# Patient Record
Sex: MALE | Race: WHITE | NOT HISPANIC OR LATINO | Employment: OTHER | URBAN - METROPOLITAN AREA
[De-identification: names, ages, dates, MRNs, and addresses within clinical notes are randomized per-mention and may not be internally consistent; named-entity substitution may affect disease eponyms.]

---

## 2020-02-20 ENCOUNTER — APPOINTMENT (OUTPATIENT)
Dept: RADIOLOGY | Facility: MEDICAL CENTER | Age: 65
End: 2020-02-20
Attending: EMERGENCY MEDICINE

## 2020-02-20 ENCOUNTER — HOSPITAL ENCOUNTER (EMERGENCY)
Facility: MEDICAL CENTER | Age: 65
End: 2020-02-20
Attending: EMERGENCY MEDICINE

## 2020-02-20 VITALS
SYSTOLIC BLOOD PRESSURE: 106 MMHG | DIASTOLIC BLOOD PRESSURE: 62 MMHG | OXYGEN SATURATION: 94 % | BODY MASS INDEX: 30.07 KG/M2 | HEIGHT: 73 IN | HEART RATE: 80 BPM | TEMPERATURE: 96.9 F | WEIGHT: 226.85 LBS | RESPIRATION RATE: 14 BRPM

## 2020-02-20 DIAGNOSIS — R10.11 ABDOMINAL PAIN, RUQ: ICD-10-CM

## 2020-02-20 DIAGNOSIS — K74.60 CIRRHOSIS OF LIVER WITHOUT ASCITES, UNSPECIFIED HEPATIC CIRRHOSIS TYPE (HCC): ICD-10-CM

## 2020-02-20 LAB
ALBUMIN SERPL BCP-MCNC: 4.1 G/DL (ref 3.2–4.9)
ALBUMIN/GLOB SERPL: 1 G/DL
ALP SERPL-CCNC: 76 U/L (ref 30–99)
ALT SERPL-CCNC: 124 U/L (ref 2–50)
ANION GAP SERPL CALC-SCNC: 11 MMOL/L (ref 0–11.9)
APPEARANCE UR: CLEAR
AST SERPL-CCNC: 95 U/L (ref 12–45)
BASOPHILS # BLD AUTO: 0.9 % (ref 0–1.8)
BASOPHILS # BLD: 0.08 K/UL (ref 0–0.12)
BILIRUB SERPL-MCNC: 0.6 MG/DL (ref 0.1–1.5)
BILIRUB UR QL STRIP.AUTO: NEGATIVE
BUN SERPL-MCNC: 24 MG/DL (ref 8–22)
CALCIUM SERPL-MCNC: 9.4 MG/DL (ref 8.5–10.5)
CHLORIDE SERPL-SCNC: 103 MMOL/L (ref 96–112)
CO2 SERPL-SCNC: 23 MMOL/L (ref 20–33)
COLOR UR: YELLOW
CREAT SERPL-MCNC: 0.99 MG/DL (ref 0.5–1.4)
EOSINOPHIL # BLD AUTO: 0.25 K/UL (ref 0–0.51)
EOSINOPHIL NFR BLD: 2.8 % (ref 0–6.9)
ERYTHROCYTE [DISTWIDTH] IN BLOOD BY AUTOMATED COUNT: 43.1 FL (ref 35.9–50)
GLOBULIN SER CALC-MCNC: 4 G/DL (ref 1.9–3.5)
GLUCOSE SERPL-MCNC: 93 MG/DL (ref 65–99)
GLUCOSE UR STRIP.AUTO-MCNC: NEGATIVE MG/DL
HCT VFR BLD AUTO: 45.2 % (ref 42–52)
HGB BLD-MCNC: 16 G/DL (ref 14–18)
IMM GRANULOCYTES # BLD AUTO: 0.08 K/UL (ref 0–0.11)
IMM GRANULOCYTES NFR BLD AUTO: 0.9 % (ref 0–0.9)
KETONES UR STRIP.AUTO-MCNC: NEGATIVE MG/DL
LEUKOCYTE ESTERASE UR QL STRIP.AUTO: NEGATIVE
LIPASE SERPL-CCNC: 60 U/L (ref 11–82)
LYMPHOCYTES # BLD AUTO: 3.13 K/UL (ref 1–4.8)
LYMPHOCYTES NFR BLD: 34.9 % (ref 22–41)
MCH RBC QN AUTO: 31.6 PG (ref 27–33)
MCHC RBC AUTO-ENTMCNC: 35.4 G/DL (ref 33.7–35.3)
MCV RBC AUTO: 89.3 FL (ref 81.4–97.8)
MICRO URNS: NORMAL
MONOCYTES # BLD AUTO: 0.66 K/UL (ref 0–0.85)
MONOCYTES NFR BLD AUTO: 7.3 % (ref 0–13.4)
NEUTROPHILS # BLD AUTO: 4.78 K/UL (ref 1.82–7.42)
NEUTROPHILS NFR BLD: 53.2 % (ref 44–72)
NITRITE UR QL STRIP.AUTO: NEGATIVE
NRBC # BLD AUTO: 0 K/UL
NRBC BLD-RTO: 0 /100 WBC
PH UR STRIP.AUTO: 6 [PH] (ref 5–8)
PLATELET # BLD AUTO: 135 K/UL (ref 164–446)
PMV BLD AUTO: 9.5 FL (ref 9–12.9)
POTASSIUM SERPL-SCNC: 4 MMOL/L (ref 3.6–5.5)
PROT SERPL-MCNC: 8.1 G/DL (ref 6–8.2)
PROT UR QL STRIP: NEGATIVE MG/DL
RBC # BLD AUTO: 5.06 M/UL (ref 4.7–6.1)
RBC UR QL AUTO: NEGATIVE
SODIUM SERPL-SCNC: 137 MMOL/L (ref 135–145)
SP GR UR STRIP.AUTO: 1.02
UROBILINOGEN UR STRIP.AUTO-MCNC: 1 MG/DL
WBC # BLD AUTO: 9 K/UL (ref 4.8–10.8)

## 2020-02-20 PROCEDURE — 99284 EMERGENCY DEPT VISIT MOD MDM: CPT

## 2020-02-20 PROCEDURE — 76705 ECHO EXAM OF ABDOMEN: CPT

## 2020-02-20 PROCEDURE — 85025 COMPLETE CBC W/AUTO DIFF WBC: CPT

## 2020-02-20 PROCEDURE — 81003 URINALYSIS AUTO W/O SCOPE: CPT

## 2020-02-20 PROCEDURE — 74019 RADEX ABDOMEN 2 VIEWS: CPT

## 2020-02-20 PROCEDURE — 36415 COLL VENOUS BLD VENIPUNCTURE: CPT

## 2020-02-20 PROCEDURE — 83690 ASSAY OF LIPASE: CPT

## 2020-02-20 PROCEDURE — 80053 COMPREHEN METABOLIC PANEL: CPT

## 2020-02-20 RX ORDER — OMEPRAZOLE 40 MG/1
40 CAPSULE, DELAYED RELEASE ORAL DAILY
Qty: 30 CAP | Refills: 0 | Status: SHIPPED | OUTPATIENT
Start: 2020-02-20

## 2020-02-21 NOTE — DISCHARGE INSTRUCTIONS
We could not find a definite cause of your pain.  The pain may be from your gallbladder.  There is no ascites.  Take limited Tylenol, no more than 2 g a day if needed for pain.  Take daily Prilosec 40 mg for 4 weeks in case the pain is gastritis or ulcer.  Follow-up with your GI doctor next week to discuss the possibility of cholecystitis.  Return to the ER for severe pain, recurrent vomiting, fever or ill appearance.

## 2020-02-21 NOTE — ED NOTES
US completed, patient resting in bed comfortably. Denies needs at this time, call light within reach

## 2020-02-21 NOTE — ED NOTES
Report received, assumed care of patient. PIV placed, updated patient on POC. Call light within reach

## 2020-02-21 NOTE — ED PROVIDER NOTES
"ED Provider Note    Scribed for Stephan Lebron M.D. by Ernestina Esquivel. 2/20/2020  7:20 PM    Primary care provider: PCP Pt. States None.  Means of arrival: Walk-in  History obtained from: Patient  History limited by: None    CHIEF COMPLAINT  Chief Complaint   Patient presents with   • Abdominal Swelling     cirrhosis       HPI  Huber Ny is a 64 y.o. male who presents to the Emergency Department for evaluation of abdominal pain and bloating onset 4 days ago. He describes his pain as 7/10 in severity. His pain is exacerbated when bending down. He reports of nausea, constipation, increased urine frequency, and shortness of breath, but denies any fever, vomiting, hematuria, diarrhea, chest pain, or cough. Patient reported that he has recently been diagnosed with hepatitis C and cirrhosis. He is not on any medications currently for his liver problems. Additionally, the patient has a history of stent placements and is on Plavix. He denies a past surgical history of cholecystectomy. Patient smokes 10 cigarettes per day.     REVIEW OF SYSTEMS  Pertinent positives include: abdominal pain and bloating, nausea, constipation, increased urine frequency, shortness of breath.  Pertinent negatives include: fever, vomiting, hematuria, diarrhea, chest pain, or cough..  10+ systems reviewed and negative.      PAST MEDICAL HISTORY  Past history of cirrhosis and hepatitis C.    SOCIAL HISTORY  Social History     Tobacco Use   • Smoking status: Yes, 10 cigarettes a day   Substance Use Topics   • Alcohol use: None noted.   • Drug use: None noted.     Social History     Substance and Sexual Activity   Drug Use None noted     SURGICAL HISTORY  Stent placement    CURRENT MEDICATIONS  Plavix  ALLERGIES  No Known Allergies    PHYSICAL EXAM  VITAL SIGNS: /83   Pulse 90   Temp 36.1 °C (96.9 °F) (Temporal)   Resp 14   Ht 1.854 m (6' 1\")   Wt 102.9 kg (226 lb 13.7 oz)   SpO2 95%   BMI 29.93 kg/m²   Reviewed and hypertensive, " afebrile  Constitutional: Well developed, Well nourished, patient is resting comfortably in bed.  HENT: Normocephalic, atraumatic, bilateral external ears normal, oropharynx moist, No exudates or erythema.   Eyes: PERRLA, conjunctiva pink, no scleral icterus.   Cardiovascular: Regular rate and rhythm. No murmurs, rubs or gallops.   Respiratory: Lungs clear to auscultation bilaterally. No wheezes, rales, or rhonchi.  Abdominal:  Abdomen soft, distended, possible mild fluid wave, no real tenderness. No rebound, or guarding.    Skin: No erythema, no rash.   Genitourinary: No costovertebral angle tenderness.   Neurologic: Alert & oriented x 3, cranial nerves 2-12 intact by passive exam.  No focal deficit noted.  Psychiatric: Affect normal, Judgment normal, Mood normal.     DIFFERENTIAL DIAGNOSIS:  Hep. C, cirrhosis, alcoholic liver disease, small bowel obstruction, doubt spontaneous bacterial peritonitis, gastritis, peptic ulcer disease    RADIOLOGY/PROCEDURES  [id number] VIEWS   Final Result         1.  Moderate stool in the colon favors changes of constipation. Otherwise nonspecific bowel gas pattern   2.  Linear density in the lung bases favors atelectasis or scarring.      US-RUQ   Final Result         1.  Hepatomegaly and echogenic liver with nodular contour, compatible changes of cirrhosis.   2.  Cholelithiasis with gallbladder wall thickness of the upper limits of normal   3.  Linear echogenic defect in the lower pole of the right kidney suggests scarring, could be followed up for more definitive characterization with CT of the kidneys with contrast.   4.  Atherosclerosis        Radiologist interpretation have been reviewed by me.     LABORATORY:  Results for orders placed or performed during the hospital encounter of 02/20/20   CBC WITH DIFFERENTIAL   Result Value Ref Range    WBC 9.0 4.8 - 10.8 K/uL    RBC 5.06 4.70 - 6.10 M/uL    Hemoglobin 16.0 14.0 - 18.0 g/dL    Hematocrit 45.2 42.0 - 52.0 %    MCV 89.3  81.4 - 97.8 fL    MCH 31.6 27.0 - 33.0 pg    MCHC 35.4 (H) 33.7 - 35.3 g/dL    RDW 43.1 35.9 - 50.0 fL    Platelet Count 135 (L) 164 - 446 K/uL    MPV 9.5 9.0 - 12.9 fL    Neutrophils-Polys 53.20 44.00 - 72.00 %    Lymphocytes 34.90 22.00 - 41.00 %    Monocytes 7.30 0.00 - 13.40 %    Eosinophils 2.80 0.00 - 6.90 %    Basophils 0.90 0.00 - 1.80 %    Immature Granulocytes 0.90 0.00 - 0.90 %    Nucleated RBC 0.00 /100 WBC    Neutrophils (Absolute) 4.78 1.82 - 7.42 K/uL    Lymphs (Absolute) 3.13 1.00 - 4.80 K/uL    Monos (Absolute) 0.66 0.00 - 0.85 K/uL    Eos (Absolute) 0.25 0.00 - 0.51 K/uL    Baso (Absolute) 0.08 0.00 - 0.12 K/uL    Immature Granulocytes (abs) 0.08 0.00 - 0.11 K/uL    NRBC (Absolute) 0.00 K/uL   COMP METABOLIC PANEL   Result Value Ref Range    Sodium 137 135 - 145 mmol/L    Potassium 4.0 3.6 - 5.5 mmol/L    Chloride 103 96 - 112 mmol/L    Co2 23 20 - 33 mmol/L    Anion Gap 11.0 0.0 - 11.9    Glucose 93 65 - 99 mg/dL    Bun 24 (H) 8 - 22 mg/dL    Creatinine 0.99 0.50 - 1.40 mg/dL    Calcium 9.4 8.5 - 10.5 mg/dL    AST(SGOT) 95 (H) 12 - 45 U/L    ALT(SGPT) 124 (H) 2 - 50 U/L    Alkaline Phosphatase 76 30 - 99 U/L    Total Bilirubin 0.6 0.1 - 1.5 mg/dL    Albumin 4.1 3.2 - 4.9 g/dL    Total Protein 8.1 6.0 - 8.2 g/dL    Globulin 4.0 (H) 1.9 - 3.5 g/dL    A-G Ratio 1.0 g/dL   LIPASE   Result Value Ref Range    Lipase 60 11 - 82 U/L   URINALYSIS,CULTURE IF INDICATED   Result Value Ref Range    Color Yellow     Character Clear     Specific Gravity 1.016 <1.035    Ph 6.0 5.0 - 8.0    Glucose Negative Negative mg/dL    Ketones Negative Negative mg/dL    Protein Negative Negative mg/dL    Bilirubin Negative Negative    Urobilinogen, Urine 1.0 Negative    Nitrite Negative Negative    Leukocyte Esterase Negative Negative    Occult Blood Negative Negative    Micro Urine Req see below      Lab results reviewed by me.     ED COURSE:  Nursing notes, VS, PMSFHx reviewed in chart.     7:20 PM - Patient seen and  examined at bedside. Ordered Urinalysis culture, lipase, CMP, CBC with diff, US-RUQ to evaluate.     9:43 PM - Ordered DX-abdomen for further evaluation.     10:25 PM - Patient was reevaluated at bedside. Discussed lab and radiology results with the patient. I informed him that I will be prescribing him Prilosec 40 mg and to follow up with his GI doctor as soon as possible. Patient stable for discharge at this time after evaluation in the emergency department.     MEDICAL DECISION MAKING:  Well-appearing patient presents with abdominal pain without tenderness and bloating without constipation.  There is no evidence of ascites on ultrasound despite a history of cirrhosis.  There is no bowel obstruction.  Ultrasound is borderline for gallbladder wall thickening so there is a chance this is early cholecystitis.  Dyspepsia is also possible.  Patient is clinically well-appearing and is not eager to have his gallbladder removed if not absolutely necessary.  I will refer the patient to his gastroenterologist tomorrow or next week for evaluation and decision about his gallbladder.  The patient verbalized understanding he is to return for severe pain, pain fever, uncontrolled vomiting, dizziness or ill appearance.    PLAN:  New Prescriptions    OMEPRAZOLE (PRILOSEC) 40 MG DELAYED-RELEASE CAPSULE    Take 1 Cap by mouth every day.     Admit Tylenol to 2 g daily for pain    Return for ear pain, pain and fever, uncontrolled vomiting, dizziness, ill appearance    [facility]  [address] [place] [id number] [phone]  Schedule an appointment as soon as possible for a visit   next week    CONDITION: good.     FINAL IMPRESSION  1. Abdominal pain, RUQ    2. Cirrhosis of liver without ascites, unspecified hepatic cirrhosis type (HCC)    3.      Borderline gallbladder wall thickening     Ernestina MANZO (Alex), am scribing for, and in the presence of, Stephan Lebron M.D..    Electronically signed by:  Ernestina Esquivel (Scribe), 2/20/2020    I, Stephan Lebron M.D. personally performed the services described in this documentation, as scribed by Ernestina Esquivel in my presence, and it is both accurate and complete.C.    The note accurately reflects work and decisions made by me.  Stephan Lebron M.D.  2/20/2020  10:56 PM

## 2020-05-04 ENCOUNTER — APPOINTMENT (OUTPATIENT)
Dept: RADIOLOGY | Facility: MEDICAL CENTER | Age: 65
End: 2020-05-04
Attending: EMERGENCY MEDICINE
Payer: MEDICARE

## 2020-05-04 ENCOUNTER — HOSPITAL ENCOUNTER (EMERGENCY)
Facility: MEDICAL CENTER | Age: 65
End: 2020-05-04
Attending: EMERGENCY MEDICINE
Payer: MEDICARE

## 2020-05-04 VITALS
HEIGHT: 73 IN | DIASTOLIC BLOOD PRESSURE: 71 MMHG | SYSTOLIC BLOOD PRESSURE: 110 MMHG | BODY MASS INDEX: 27.17 KG/M2 | HEART RATE: 77 BPM | RESPIRATION RATE: 20 BRPM | WEIGHT: 205 LBS | OXYGEN SATURATION: 97 % | TEMPERATURE: 99.3 F

## 2020-05-04 DIAGNOSIS — L03.116 CELLULITIS OF LEFT LOWER EXTREMITY: ICD-10-CM

## 2020-05-04 DIAGNOSIS — J06.9 VIRAL UPPER RESPIRATORY TRACT INFECTION: ICD-10-CM

## 2020-05-04 LAB
ALBUMIN SERPL BCP-MCNC: 4 G/DL (ref 3.2–4.9)
ALBUMIN/GLOB SERPL: 1.3 G/DL
ALP SERPL-CCNC: 66 U/L (ref 30–99)
ALT SERPL-CCNC: 57 U/L (ref 2–50)
ANION GAP SERPL CALC-SCNC: 10 MMOL/L (ref 7–16)
AST SERPL-CCNC: 42 U/L (ref 12–45)
BASOPHILS # BLD AUTO: 0.6 % (ref 0–1.8)
BASOPHILS # BLD: 0.04 K/UL (ref 0–0.12)
BILIRUB SERPL-MCNC: 0.7 MG/DL (ref 0.1–1.5)
BUN SERPL-MCNC: 11 MG/DL (ref 8–22)
CALCIUM SERPL-MCNC: 9.1 MG/DL (ref 8.5–10.5)
CHLORIDE SERPL-SCNC: 96 MMOL/L (ref 96–112)
CO2 SERPL-SCNC: 26 MMOL/L (ref 20–33)
CREAT SERPL-MCNC: 0.71 MG/DL (ref 0.5–1.4)
EOSINOPHIL # BLD AUTO: 0.15 K/UL (ref 0–0.51)
EOSINOPHIL NFR BLD: 2.1 % (ref 0–6.9)
ERYTHROCYTE [DISTWIDTH] IN BLOOD BY AUTOMATED COUNT: 46.9 FL (ref 35.9–50)
GLOBULIN SER CALC-MCNC: 3.1 G/DL (ref 1.9–3.5)
GLUCOSE SERPL-MCNC: 100 MG/DL (ref 65–99)
HCT VFR BLD AUTO: 44.9 % (ref 42–52)
HGB BLD-MCNC: 14.7 G/DL (ref 14–18)
IMM GRANULOCYTES # BLD AUTO: 0.05 K/UL (ref 0–0.11)
IMM GRANULOCYTES NFR BLD AUTO: 0.7 % (ref 0–0.9)
LYMPHOCYTES # BLD AUTO: 2.26 K/UL (ref 1–4.8)
LYMPHOCYTES NFR BLD: 31.1 % (ref 22–41)
MCH RBC QN AUTO: 30.5 PG (ref 27–33)
MCHC RBC AUTO-ENTMCNC: 32.7 G/DL (ref 33.7–35.3)
MCV RBC AUTO: 93.2 FL (ref 81.4–97.8)
MONOCYTES # BLD AUTO: 0.53 K/UL (ref 0–0.85)
MONOCYTES NFR BLD AUTO: 7.3 % (ref 0–13.4)
NEUTROPHILS # BLD AUTO: 4.24 K/UL (ref 1.82–7.42)
NEUTROPHILS NFR BLD: 58.2 % (ref 44–72)
NRBC # BLD AUTO: 0 K/UL
NRBC BLD-RTO: 0 /100 WBC
PLATELET # BLD AUTO: 114 K/UL (ref 164–446)
PMV BLD AUTO: 9.1 FL (ref 9–12.9)
POTASSIUM SERPL-SCNC: 3.9 MMOL/L (ref 3.6–5.5)
PROT SERPL-MCNC: 7.1 G/DL (ref 6–8.2)
RBC # BLD AUTO: 4.82 M/UL (ref 4.7–6.1)
SODIUM SERPL-SCNC: 132 MMOL/L (ref 135–145)
WBC # BLD AUTO: 7.3 K/UL (ref 4.8–10.8)

## 2020-05-04 PROCEDURE — 96374 THER/PROPH/DIAG INJ IV PUSH: CPT

## 2020-05-04 PROCEDURE — 99284 EMERGENCY DEPT VISIT MOD MDM: CPT

## 2020-05-04 PROCEDURE — 36415 COLL VENOUS BLD VENIPUNCTURE: CPT

## 2020-05-04 PROCEDURE — 85025 COMPLETE CBC W/AUTO DIFF WBC: CPT

## 2020-05-04 PROCEDURE — 71045 X-RAY EXAM CHEST 1 VIEW: CPT

## 2020-05-04 PROCEDURE — 87040 BLOOD CULTURE FOR BACTERIA: CPT

## 2020-05-04 PROCEDURE — 80053 COMPREHEN METABOLIC PANEL: CPT

## 2020-05-04 PROCEDURE — 700105 HCHG RX REV CODE 258: Performed by: EMERGENCY MEDICINE

## 2020-05-04 PROCEDURE — 700111 HCHG RX REV CODE 636 W/ 250 OVERRIDE (IP): Performed by: EMERGENCY MEDICINE

## 2020-05-04 RX ORDER — SULFAMETHOXAZOLE AND TRIMETHOPRIM 800; 160 MG/1; MG/1
1 TABLET ORAL 2 TIMES DAILY
Qty: 14 TAB | Refills: 0 | Status: SHIPPED | OUTPATIENT
Start: 2020-05-04 | End: 2020-05-11

## 2020-05-04 RX ORDER — GABAPENTIN 300 MG/1
300 CAPSULE ORAL 2 TIMES DAILY
COMMUNITY

## 2020-05-04 RX ORDER — ATORVASTATIN CALCIUM 10 MG/1
10 TABLET, FILM COATED ORAL NIGHTLY
COMMUNITY

## 2020-05-04 RX ORDER — CEPHALEXIN 500 MG/1
500 CAPSULE ORAL 4 TIMES DAILY
Qty: 28 CAP | Refills: 0 | Status: SHIPPED | OUTPATIENT
Start: 2020-05-04

## 2020-05-04 RX ORDER — CLOPIDOGREL BISULFATE 75 MG/1
75 TABLET ORAL DAILY
COMMUNITY

## 2020-05-04 RX ADMIN — AMPICILLIN SODIUM AND SULBACTAM SODIUM 3 G: 2; 1 INJECTION, POWDER, FOR SOLUTION INTRAMUSCULAR; INTRAVENOUS at 10:22

## 2020-05-04 ASSESSMENT — FIBROSIS 4 INDEX: FIB4 SCORE: 3.98

## 2020-05-04 NOTE — ED PROVIDER NOTES
ED Provider Note    CHIEF COMPLAINT  Chief Complaint   Patient presents with   • Leg Swelling     LEFT leg edema w/ redness and pain   • Cough     3 days of productive cough, + smoker   • Fever       HPI  Huber Ny is a 64 y.o. male who presents for evaluation of left leg pain, redness, and swelling.  He states his symptoms started 2 days ago and have gotten progressively worse.  In the chief complaint at states fever broke when I asked the patient he states he has not had any fevers.  He is a smoker and over the past 3 days he has had a slightly productive cough.  He denies shortness of breath.  He has had no travel.  He denies COVID contacts.  Denies any trauma.    REVIEW OF SYSTEMS  See HPI for further details. All other systems negative.    PAST MEDICAL HISTORY  Past Medical History:   Diagnosis Date   • CAD (coronary artery disease)    • Hypertension    • MI (myocardial infarction) (HCC)     states hx of 9 MI       FAMILY HISTORY  History reviewed. No pertinent family history.    SOCIAL HISTORY  Social History     Socioeconomic History   • Marital status: Single     Spouse name: Not on file   • Number of children: Not on file   • Years of education: Not on file   • Highest education level: Not on file   Occupational History   • Not on file   Social Needs   • Financial resource strain: Not on file   • Food insecurity     Worry: Not on file     Inability: Not on file   • Transportation needs     Medical: Not on file     Non-medical: Not on file   Tobacco Use   • Smoking status: Current Every Day Smoker     Packs/day: 1.00     Types: Cigarettes   • Smokeless tobacco: Never Used   Substance and Sexual Activity   • Alcohol use: Not Currently   • Drug use: Not Currently   • Sexual activity: Not on file   Lifestyle   • Physical activity     Days per week: Not on file     Minutes per session: Not on file   • Stress: Not on file   Relationships   • Social connections     Talks on phone: Not on file     Gets  "together: Not on file     Attends [employer] service: Not on file     Active member of club or organization: Not on file     Attends meetings of clubs or organizations: Not on file     Relationship status: Not on file   • Intimate partner violence     Fear of current or ex partner: Not on file     Emotionally abused: Not on file     Physically abused: Not on file     Forced sexual activity: Not on file   Other Topics Concern   • Not on file   Social History Narrative   • Not on file       SURGICAL HISTORY  History reviewed. No pertinent surgical history.    CURRENT MEDICATIONS  Home Medications     Reviewed by Kieran Hernandez R.N. (Registered Nurse) on 05/04/20 at 0918  Med List Status: Partial   Medication Last Dose Status   atorvastatin (LIPITOR) 10 MG Tab 4/13/2020 Active   clopidogrel (PLAVIX) 75 MG Tab 4/13/2020 Active   gabapentin (NEURONTIN) 300 MG Cap 4/13/2020 Active   omeprazole (PRILOSEC) 40 MG delayed-release capsule  Active                ALLERGIES  No Known Allergies    PHYSICAL EXAM  VITAL SIGNS: /79   Pulse (!) 103   Temp 37.4 °C (99.3 °F) (Oral)   Resp 18   Ht 1.854 m (6' 1\")   Wt 93 kg (205 lb)   SpO2 96%   BMI 27.05 kg/m²   Constitutional: Well developed, Well nourished, No acute distress, Non-toxic appearance.   HENT: Normocephalic, Atraumatic.  Eyes:  EOMI, Conjunctiva normal, No discharge.   Cardiovascular: Very slight tachycardia.  Thorax & Lungs: No respiratory distress.  Skin: Warm, Dry.  Musculoskeletal: Left lower extremity shows erythema and warmth to the foot extending up mostly anteriorly in the lower leg.  He has no calf tenderness or swelling.  There is no crepitance.  There are no appreciable open wounds.  Neurologic: Awake and alert, No focal deficits noted.       RADIOLOGY/PROCEDURES  DX-CHEST-PORTABLE (1 VIEW)   Final Result      Linear bibasilar atelectasis.            COURSE & MEDICAL DECISION MAKING  Pertinent Labs & Imaging studies reviewed. (See chart for " details)  This is a 64-year-old here for evaluation of left leg pain and swelling.  Patient has a 2-day history of redness and pain in his left foot and lower leg.  He denies any injury.  He has had no fevers.  On exam this appears to represent a cellulitis.  I do not suspect DVT.  He is also had a cough which is been productive.  The patient is a smoker.  He is not hypoxemic and has no respiratory distress and is had no fevers, travel, or exposure to known [facility] patients.  Chest x-ray shows atelectasis with no evidence of an acute infiltrative process.  Chemistries are unremarkable.  CBC shows normal white count and differential.  Blood cultures were obtained.  Patient is treated with Unasyn 3 g IV.  I believe that this does represent a cellulitis.  I have discussed the situation with the patient and explained that it would be reasonable to try an outpatient course of oral antibiotics after his initial IV dose here.  The patient is in full agreement with that.  I will provide him a prescription for Keflex and Bactrim.  He will return here for any worsening symptoms.  He is given a discharge instruction sheet on cellulitis and on viral URIs.    FINAL IMPRESSION  1.  Cellulitis of the left foot and lower leg  2.  Viral URI  3.         Electronically signed by: Ashish Thapa M.D., 5/4/2020 9:25 AM

## 2020-05-04 NOTE — ED NOTES
Patient discharged to home with self. Patient alert and oriented x 4, ambulatory with steady gait. Patient verbalizes understanding of discharge instructions, follow up care, return precautions, and prescriptions x 2. Patient denies questions at time of discharge.

## 2020-05-04 NOTE — ED TRIAGE NOTES
Patient presents via triage for evaluation of RLE redness/swelling/pain which he suspects is cellulitis.     Patient also states productive cough w/ yellow sputum and sensation of fever.     Vitals:    05/04/20 0915   BP: 152/79   Pulse: (!) 103   Resp: 18   Temp: 37.4 °C (99.3 °F)   SpO2: 96%

## 2020-05-09 LAB
BACTERIA BLD CULT: NORMAL
BACTERIA BLD CULT: NORMAL
SIGNIFICANT IND 70042: NORMAL
SIGNIFICANT IND 70042: NORMAL
SITE SITE: NORMAL
SITE SITE: NORMAL
SOURCE SOURCE: NORMAL
SOURCE SOURCE: NORMAL